# Patient Record
Sex: FEMALE | Race: WHITE | NOT HISPANIC OR LATINO | URBAN - METROPOLITAN AREA
[De-identification: names, ages, dates, MRNs, and addresses within clinical notes are randomized per-mention and may not be internally consistent; named-entity substitution may affect disease eponyms.]

---

## 2019-10-08 ENCOUNTER — OFFICE (OUTPATIENT)
Dept: URBAN - METROPOLITAN AREA CLINIC 101 | Facility: CLINIC | Age: 39
End: 2019-10-08

## 2019-10-08 VITALS
WEIGHT: 183 LBS | DIASTOLIC BLOOD PRESSURE: 65 MMHG | SYSTOLIC BLOOD PRESSURE: 102 MMHG | HEART RATE: 73 BPM | HEIGHT: 64 IN | TEMPERATURE: 97.7 F

## 2019-10-08 DIAGNOSIS — R10.13 EPIGASTRIC PAIN: ICD-10-CM

## 2019-10-08 DIAGNOSIS — K21.0 GASTRO-ESOPHAGEAL REFLUX DISEASE WITH ESOPHAGITIS: ICD-10-CM

## 2019-10-08 DIAGNOSIS — K58.0 IRRITABLE BOWEL SYNDROME WITH DIARRHEA: ICD-10-CM

## 2019-10-08 PROCEDURE — 99244 OFF/OP CNSLTJ NEW/EST MOD 40: CPT

## 2019-11-05 ENCOUNTER — OFFICE (OUTPATIENT)
Dept: URBAN - METROPOLITAN AREA CLINIC 100 | Facility: CLINIC | Age: 39
End: 2019-11-05

## 2019-11-05 ENCOUNTER — OFFICE (OUTPATIENT)
Dept: URBAN - METROPOLITAN AREA PATHOLOGY 17 | Facility: PATHOLOGY | Age: 39
End: 2019-11-05

## 2019-11-05 VITALS
SYSTOLIC BLOOD PRESSURE: 129 MMHG | DIASTOLIC BLOOD PRESSURE: 75 MMHG | RESPIRATION RATE: 14 BRPM | HEART RATE: 76 BPM | HEART RATE: 71 BPM | HEART RATE: 77 BPM | TEMPERATURE: 97.7 F | WEIGHT: 183 LBS | OXYGEN SATURATION: 99 % | DIASTOLIC BLOOD PRESSURE: 74 MMHG | SYSTOLIC BLOOD PRESSURE: 125 MMHG | OXYGEN SATURATION: 95 % | SYSTOLIC BLOOD PRESSURE: 124 MMHG | HEIGHT: 64 IN | HEART RATE: 69 BPM | SYSTOLIC BLOOD PRESSURE: 142 MMHG | DIASTOLIC BLOOD PRESSURE: 70 MMHG | SYSTOLIC BLOOD PRESSURE: 126 MMHG | DIASTOLIC BLOOD PRESSURE: 67 MMHG | TEMPERATURE: 97.9 F | RESPIRATION RATE: 16 BRPM | OXYGEN SATURATION: 96 % | RESPIRATION RATE: 12 BRPM

## 2019-11-05 DIAGNOSIS — R10.13 EPIGASTRIC PAIN: ICD-10-CM

## 2019-11-05 DIAGNOSIS — K29.60 OTHER GASTRITIS WITHOUT BLEEDING: ICD-10-CM

## 2019-11-05 DIAGNOSIS — K21.0 GASTRO-ESOPHAGEAL REFLUX DISEASE WITH ESOPHAGITIS: ICD-10-CM

## 2019-11-05 PROCEDURE — 88313 SPECIAL STAINS GROUP 2: CPT

## 2019-11-05 PROCEDURE — 43239 EGD BIOPSY SINGLE/MULTIPLE: CPT

## 2019-11-05 PROCEDURE — 88305 TISSUE EXAM BY PATHOLOGIST: CPT

## 2019-11-05 PROCEDURE — 88342 IMHCHEM/IMCYTCHM 1ST ANTB: CPT

## 2019-11-05 RX ORDER — OMEPRAZOLE 40 MG/1
CAPSULE, DELAYED RELEASE ORAL
Qty: 90 | Refills: 3 | Status: COMPLETED
Start: 2019-11-05 | End: 2022-07-21

## 2021-04-06 ENCOUNTER — APPOINTMENT (RX ONLY)
Dept: URBAN - METROPOLITAN AREA CLINIC 371 | Facility: CLINIC | Age: 41
Setting detail: DERMATOLOGY
End: 2021-04-06

## 2021-04-06 DIAGNOSIS — I78.8 OTHER DISEASES OF CAPILLARIES: ICD-10-CM

## 2021-04-06 DIAGNOSIS — L28.0 LICHEN SIMPLEX CHRONICUS: ICD-10-CM | Status: INADEQUATELY CONTROLLED

## 2021-04-06 DIAGNOSIS — D18.0 HEMANGIOMA: ICD-10-CM

## 2021-04-06 DIAGNOSIS — Z71.89 OTHER SPECIFIED COUNSELING: ICD-10-CM

## 2021-04-06 DIAGNOSIS — D22 MELANOCYTIC NEVI: ICD-10-CM

## 2021-04-06 DIAGNOSIS — L82.1 OTHER SEBORRHEIC KERATOSIS: ICD-10-CM

## 2021-04-06 DIAGNOSIS — L81.4 OTHER MELANIN HYPERPIGMENTATION: ICD-10-CM

## 2021-04-06 PROBLEM — D18.01 HEMANGIOMA OF SKIN AND SUBCUTANEOUS TISSUE: Status: ACTIVE | Noted: 2021-04-06

## 2021-04-06 PROBLEM — D22.5 MELANOCYTIC NEVI OF TRUNK: Status: ACTIVE | Noted: 2021-04-06

## 2021-04-06 PROCEDURE — ? COSMETIC CONSULTATION - RED SPOTS

## 2021-04-06 PROCEDURE — 99204 OFFICE O/P NEW MOD 45 MIN: CPT

## 2021-04-06 PROCEDURE — ? COUNSELING

## 2021-04-06 PROCEDURE — ? PRESCRIPTION

## 2021-04-06 PROCEDURE — ? TREATMENT REGIMEN

## 2021-04-06 RX ORDER — HALOBETASOL PROPIONATE 0.1 MG/G
LOTION TOPICAL
Qty: 1 | Refills: 1 | Status: ERX | COMMUNITY
Start: 2021-04-06

## 2021-04-06 RX ADMIN — HALOBETASOL PROPIONATE: 0.1 LOTION TOPICAL at 00:00

## 2021-04-06 ASSESSMENT — LOCATION DETAILED DESCRIPTION DERM
LOCATION DETAILED: SUPERIOR MID FOREHEAD
LOCATION DETAILED: RIGHT INFERIOR CENTRAL MALAR CHEEK
LOCATION DETAILED: RIGHT ANKLE
LOCATION DETAILED: EPIGASTRIC SKIN
LOCATION DETAILED: LEFT CENTRAL MALAR CHEEK

## 2021-04-06 ASSESSMENT — LOCATION SIMPLE DESCRIPTION DERM
LOCATION SIMPLE: RIGHT CHEEK
LOCATION SIMPLE: RIGHT ANKLE
LOCATION SIMPLE: LEFT CHEEK
LOCATION SIMPLE: SUPERIOR FOREHEAD
LOCATION SIMPLE: ABDOMEN

## 2021-04-06 ASSESSMENT — LOCATION ZONE DERM
LOCATION ZONE: TRUNK
LOCATION ZONE: FACE
LOCATION ZONE: LEG

## 2021-04-06 NOTE — PROCEDURE: COUNSELING
Detail Level: Generalized
Detail Level: Zone
Sunscreen Recommendations: Skin better science smart tone

## 2022-07-21 ENCOUNTER — TELEHEALTH PROVIDED OTHER THAN IN PATIENT'S HOME (OUTPATIENT)
Dept: URBAN - METROPOLITAN AREA TELEHEALTH 12 | Facility: TELEHEALTH | Age: 42
End: 2022-07-21

## 2022-07-21 VITALS — WEIGHT: 162 LBS | HEIGHT: 64 IN

## 2022-07-21 DIAGNOSIS — R19.7 DIARRHEA, UNSPECIFIED: ICD-10-CM

## 2022-07-21 DIAGNOSIS — K21.00 GASTRO-ESOPHAGEAL REFLUX DISEASE WITH ESOPHAGITIS, WITHOUT B: ICD-10-CM

## 2022-07-21 DIAGNOSIS — K29.70 GASTRITIS, UNSPECIFIED, WITHOUT BLEEDING: ICD-10-CM

## 2022-07-21 PROCEDURE — 99214 OFFICE O/P EST MOD 30 MIN: CPT | Mod: 95 | Performed by: INTERNAL MEDICINE

## 2022-07-21 NOTE — SERVICEHPINOTES
PATIENT VERIFIED BY DATE OF BIRTH AND NAME. Patient has been consented for this telecommunication visit.
egrald RashidAnnika Morales is here for f/u.  Ever since her GB removal in 2018, she's been having loose, watery stools, at times explosive, several times a week.  There is some mucous discharge, but no rectal bleeding.  No abdominal pain or weight loss. No prior S-scope.  No fevers, no recent abx, no sick contacts or travel.
gerald duarte See clinical summary below:'
gerald duarte Last seen in fall of 2019 for variety of GI issues was seen for GERD, irregular BMs.  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 Had several years of acid reflux issues. + water brash, burping / belching, bloating.  Used to take "several Ibuprofens" per day (at least 800 - 1200 mg on empty stomach) for many year years, for headaches, but eventually stopped this.  Subsequent w/u led to EGD on 11/2019 which revealed mild gastritis, neg biopsies, placed on Omeprazole 40 mg QD for some time and this eventually resolved.  gerald Rosa 10 point review of systems have been reviewed as per HPI and otherwise negative. gerald

## 2023-01-24 PROBLEM — K29.70 GASTRITIS, UNSPECIFIED, WITHOUT BLEEDING: Status: ACTIVE | Noted: 2019-11-05

## 2023-04-27 ENCOUNTER — OFFICE (OUTPATIENT)
Dept: URBAN - METROPOLITAN AREA CLINIC 102 | Facility: CLINIC | Age: 43
End: 2023-04-27

## 2023-04-27 PROCEDURE — 00031: CPT | Performed by: INTERNAL MEDICINE

## 2023-05-04 ENCOUNTER — OFFICE (OUTPATIENT)
Dept: URBAN - METROPOLITAN AREA CLINIC 30 | Facility: CLINIC | Age: 43
End: 2023-05-04

## 2023-05-04 ENCOUNTER — OFFICE (OUTPATIENT)
Dept: URBAN - METROPOLITAN AREA PATHOLOGY 18 | Facility: PATHOLOGY | Age: 43
End: 2023-05-04

## 2023-05-04 VITALS
RESPIRATION RATE: 18 BRPM | DIASTOLIC BLOOD PRESSURE: 60 MMHG | SYSTOLIC BLOOD PRESSURE: 112 MMHG | SYSTOLIC BLOOD PRESSURE: 119 MMHG | HEART RATE: 61 BPM | SYSTOLIC BLOOD PRESSURE: 118 MMHG | OXYGEN SATURATION: 99 % | HEIGHT: 64 IN | SYSTOLIC BLOOD PRESSURE: 135 MMHG | DIASTOLIC BLOOD PRESSURE: 56 MMHG | OXYGEN SATURATION: 98 % | SYSTOLIC BLOOD PRESSURE: 125 MMHG | DIASTOLIC BLOOD PRESSURE: 34 MMHG | HEART RATE: 67 BPM | DIASTOLIC BLOOD PRESSURE: 61 MMHG | HEART RATE: 75 BPM | HEART RATE: 70 BPM | WEIGHT: 162 LBS | RESPIRATION RATE: 22 BRPM | DIASTOLIC BLOOD PRESSURE: 89 MMHG | HEART RATE: 73 BPM | DIASTOLIC BLOOD PRESSURE: 76 MMHG | TEMPERATURE: 97.5 F | OXYGEN SATURATION: 100 % | SYSTOLIC BLOOD PRESSURE: 107 MMHG | RESPIRATION RATE: 16 BRPM | RESPIRATION RATE: 19 BRPM | OXYGEN SATURATION: 95 %

## 2023-05-04 DIAGNOSIS — R19.7 DIARRHEA, UNSPECIFIED: ICD-10-CM

## 2023-05-04 DIAGNOSIS — K63.5 POLYP OF COLON: ICD-10-CM

## 2023-05-04 PROCEDURE — 88305 TISSUE EXAM BY PATHOLOGIST: CPT | Performed by: PATHOLOGY

## 2023-05-04 RX ORDER — COLESTIPOL HYDROCHLORIDE 1 G/1
TABLET, FILM COATED ORAL
Qty: 360 | Refills: 2 | Status: ACTIVE